# Patient Record
Sex: FEMALE | URBAN - METROPOLITAN AREA
[De-identification: names, ages, dates, MRNs, and addresses within clinical notes are randomized per-mention and may not be internally consistent; named-entity substitution may affect disease eponyms.]

---

## 2022-06-04 ENCOUNTER — TELEPHONE ENCOUNTER (OUTPATIENT)
Dept: URBAN - METROPOLITAN AREA CLINIC 68 | Facility: CLINIC | Age: 62
End: 2022-06-04

## 2022-06-05 ENCOUNTER — TELEPHONE ENCOUNTER (OUTPATIENT)
Dept: URBAN - METROPOLITAN AREA CLINIC 68 | Facility: CLINIC | Age: 62
End: 2022-06-05

## 2022-06-05 RX ORDER — ESTRADIOL 0.07 MG/D
CLIMARA( 0.075MG/24HR TRANSDERMAL  ONCE A WEEK ) ACTIVE -HX ENTRY PATCH TRANSDERMAL
Status: ACTIVE | COMMUNITY
Start: 2012-09-28

## 2022-06-25 ENCOUNTER — TELEPHONE ENCOUNTER (OUTPATIENT)
Age: 62
End: 2022-06-25

## 2022-06-26 ENCOUNTER — TELEPHONE ENCOUNTER (OUTPATIENT)
Age: 62
End: 2022-06-26

## 2022-06-26 RX ORDER — ESTRADIOL 0.07 MG/D
CLIMARA( 0.075MG/24HR TRANSDERMAL  ONCE A WEEK ) ACTIVE -HX ENTRY PATCH TRANSDERMAL
Status: ACTIVE | COMMUNITY
Start: 2012-09-28

## 2022-06-26 RX ORDER — SODIUM SULFATE, POTASSIUM SULFATE, MAGNESIUM SULFATE 17.5; 3.13; 1.6 G/ML; G/ML; G/ML
SOLUTION, CONCENTRATE ORAL AS DIRECTED
Qty: 1 | Refills: 0 | Status: ACTIVE | COMMUNITY
Start: 2012-09-28

## 2022-07-30 ENCOUNTER — TELEPHONE ENCOUNTER (OUTPATIENT)
Age: 62
End: 2022-07-30

## 2022-07-31 ENCOUNTER — TELEPHONE ENCOUNTER (OUTPATIENT)
Age: 62
End: 2022-07-31

## 2024-12-06 ENCOUNTER — LAB OUTSIDE AN ENCOUNTER (OUTPATIENT)
Dept: URBAN - METROPOLITAN AREA CLINIC 60 | Facility: CLINIC | Age: 64
End: 2024-12-06

## 2024-12-06 ENCOUNTER — DASHBOARD ENCOUNTERS (OUTPATIENT)
Age: 64
End: 2024-12-06

## 2024-12-06 ENCOUNTER — OFFICE VISIT (OUTPATIENT)
Dept: URBAN - METROPOLITAN AREA CLINIC 60 | Facility: CLINIC | Age: 64
End: 2024-12-06
Payer: MEDICARE

## 2024-12-06 VITALS
TEMPERATURE: 98.2 F | BODY MASS INDEX: 23.81 KG/M2 | DIASTOLIC BLOOD PRESSURE: 72 MMHG | WEIGHT: 129.4 LBS | RESPIRATION RATE: 20 BRPM | HEIGHT: 62 IN | OXYGEN SATURATION: 97 % | SYSTOLIC BLOOD PRESSURE: 118 MMHG | HEART RATE: 86 BPM

## 2024-12-06 DIAGNOSIS — Z12.11 SCREENING FOR MALIGNANT NEOPLASM OF COLON: ICD-10-CM

## 2024-12-06 PROCEDURE — 99203 OFFICE O/P NEW LOW 30 MIN: CPT

## 2024-12-06 RX ORDER — DEXTROAMPHETAMINE SACCHARATE, AMPHETAMINE ASPARTATE, DEXTROAMPHETAMINE SULFATE AND AMPHETAMINE SULFATE 3.75; 3.75; 3.75; 3.75 MG/1; MG/1; MG/1; MG/1
TAKE ONE TABLET BY MOUTH TWICE A DAY TABLET ORAL
Qty: 60 UNSPECIFIED | Refills: 0 | Status: ACTIVE | COMMUNITY

## 2024-12-06 NOTE — HPI-TODAY'S VISIT:
Patient is a 64-year-old female with a past medical history of diverticulitis who presents today for colonoscopy evaluation.  At today's visit patient states she is feeling well and has no new GI complaints or concerns.  She does tell me that on Monday she experienced severe abdominal pain and bloating along with nausea, vomiting, diarrhea and diaphoresis.  She denies hematochezia and melena.  She states that her symptoms lasted a couple of days before improving.  Today she states that her symptoms have pretty much resolved although she is still feeling a little weak.  Discussed with her that this was likely an episode of viral gastroenteritis as opposed to diverticulitis.  She is due for a repeat colonoscopy.  Her last colonoscopy was in February 2021 in [place] and per patient polyps were found.  She had a 3-year recall at that time.  She also tells me that she has a history of a rectocele and is looking to establish with the urogynecologist for surgical correction.  Patient denies issues with anesthesia, history of stroke, heart attack, pacemaker, defibrillator, stents, blood thinners, COPD, asthma, JAQUI, chronic kidney disease and seizures. . Colonoscopy 10/23/2012 with Dr. Li with [facility] - 3 mm polyp in the sigmoid colon - Left-sided diverticulosis, no signs of active diverticulitis - Internal hemorrhoids - Pathology: Sigmoid colon polyp-hyperplastic polyp - Repeat colonoscopy in 5-10 years

## 2024-12-06 NOTE — PHYSICAL EXAM CHEST:
Please review E-advice below. Patient questioning if irregular sleep cycles due to alternating work shifts could worsen vasovagal syncope episodes. Further description on symptoms below. Managed on midodrine 2.5 mg twice daily. Last office visit on 11/21/19 and recommended 2 year follow up.     breathing is unlabored without accessory muscle use

## 2025-03-28 ENCOUNTER — OFFICE VISIT (OUTPATIENT)
Dept: URBAN - METROPOLITAN AREA SURGERY CENTER 4 | Facility: SURGERY CENTER | Age: 65
End: 2025-03-28
Payer: MEDICARE

## 2025-03-28 DIAGNOSIS — K57.30 DIVERTICULOSIS OF LARGE INTESTINE WITHOUT PERFORATION OR ABSCESS WITHOUT BLEEDING: ICD-10-CM

## 2025-03-28 DIAGNOSIS — K63.5 COLON POLYP: ICD-10-CM

## 2025-03-28 DIAGNOSIS — K62.1 RECTAL POLYP: ICD-10-CM

## 2025-03-28 DIAGNOSIS — Z86.0100 PERSONAL HISTORY OF COLON POLYPS, UNSPECIFIED: ICD-10-CM

## 2025-03-28 DIAGNOSIS — K64.0 FIRST DEGREE HEMORRHOIDS: ICD-10-CM

## 2025-03-28 PROCEDURE — 45385 COLONOSCOPY W/LESION REMOVAL: CPT | Performed by: INTERNAL MEDICINE

## 2025-03-28 PROCEDURE — 0529F INTRVL 3/>YR PTS CLNSCP DOCD: CPT | Performed by: INTERNAL MEDICINE

## 2025-03-28 PROCEDURE — 45380 COLONOSCOPY AND BIOPSY: CPT | Performed by: INTERNAL MEDICINE

## 2025-03-28 PROCEDURE — 45380 COLONOSCOPY AND BIOPSY: CPT | Performed by: CLINIC/CENTER

## 2025-03-28 PROCEDURE — 45385 COLONOSCOPY W/LESION REMOVAL: CPT | Performed by: CLINIC/CENTER

## 2025-03-28 PROCEDURE — 0529F INTRVL 3/>YR PTS CLNSCP DOCD: CPT | Performed by: CLINIC/CENTER

## 2025-03-28 RX ORDER — DEXTROAMPHETAMINE SACCHARATE, AMPHETAMINE ASPARTATE, DEXTROAMPHETAMINE SULFATE AND AMPHETAMINE SULFATE 3.75; 3.75; 3.75; 3.75 MG/1; MG/1; MG/1; MG/1
TAKE ONE TABLET BY MOUTH TWICE A DAY TABLET ORAL
Qty: 60 UNSPECIFIED | Refills: 0 | Status: ACTIVE | COMMUNITY

## 2025-04-10 ENCOUNTER — OFFICE VISIT (OUTPATIENT)
Dept: URBAN - METROPOLITAN AREA CLINIC 60 | Facility: CLINIC | Age: 65
End: 2025-04-10

## 2025-04-11 ENCOUNTER — OFFICE VISIT (OUTPATIENT)
Dept: URBAN - METROPOLITAN AREA CLINIC 60 | Facility: CLINIC | Age: 65
End: 2025-04-11
Payer: MEDICARE

## 2025-04-11 DIAGNOSIS — D36.9 ADENOMATOUS POLYP: ICD-10-CM

## 2025-04-11 PROCEDURE — 99212 OFFICE O/P EST SF 10 MIN: CPT

## 2025-04-11 RX ORDER — ESTRADIOL 1.25 MG/1.25G
APPLY ONE PACKET TO THE SKIN ONCE DAILY AS DIRECTED GEL TOPICAL
Qty: 112.5 GRAM | Refills: 0 | Status: ACTIVE | COMMUNITY

## 2025-04-11 RX ORDER — ESTRADIOL 0.1 MG/G
INSERT 0.5 GRAM VAGINALLY 3 TIMES WEEKLY CREAM VAGINAL
Qty: 42.5 GRAM | Refills: 0 | Status: ACTIVE | COMMUNITY

## 2025-04-11 RX ORDER — DEXTROAMPHETAMINE SACCHARATE, AMPHETAMINE ASPARTATE, DEXTROAMPHETAMINE SULFATE AND AMPHETAMINE SULFATE 3.75; 3.75; 3.75; 3.75 MG/1; MG/1; MG/1; MG/1
TAKE ONE TABLET BY MOUTH TWICE A DAY TABLET ORAL
Qty: 60 UNSPECIFIED | Refills: 0 | Status: ACTIVE | COMMUNITY

## 2025-04-11 NOTE — HPI-TODAY'S VISIT:
Patient is a 65-year-old female with a past medical history of diverticulitis who presents today for follow-up on colonoscopy results.  At today's visit patient states she is feeling well and has no new GI complaints or concerns.  Reviewed colonoscopy results with patient including 1 year recall.  She does have a history of rectocele and is still trying to establish with a urogynecologist for surgical intervention.   Patient denies fever, dysphagia, acid reflux, change in appetite, abdominal pain, nausea, vomiting, diarrhea, constipation, hematemesis, hematochezia, melena, change in stool frequency/caliber, unintentional weight loss. . Colonoscopy 3/28/2025 - Procedure aborted due to poor bowel prep with stool present - Internal hemorrhoids - Diverticulosis in sigmoid colon - One 12 mm polyp in ascending colon - 2 diminutive polyps in the rectum - Stool in entire examined colon - Pathology: Ascending colon polyp-sessile serrated adenoma; rectum polyp-hyperplastic polyp - Repeat colonoscopy in 1 year . Colonoscopy 10/23/2012 with Dr. Li with [facility] - 3 mm polyp in the sigmoid colon - Left-sided diverticulosis, no signs of active diverticulitis - Internal hemorrhoids - Pathology: Sigmoid colon polyp-hyperplastic polyp - Repeat colonoscopy in 5-10 years